# Patient Record
Sex: MALE | Race: WHITE | NOT HISPANIC OR LATINO
[De-identification: names, ages, dates, MRNs, and addresses within clinical notes are randomized per-mention and may not be internally consistent; named-entity substitution may affect disease eponyms.]

---
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
## 2021-01-12 ENCOUNTER — TRANSCRIPTION ENCOUNTER (OUTPATIENT)
Age: 21
End: 2021-01-12

## 2021-04-16 ENCOUNTER — APPOINTMENT (OUTPATIENT)
Dept: PEDIATRIC ORTHOPEDIC SURGERY | Facility: CLINIC | Age: 21
End: 2021-04-16
Payer: COMMERCIAL

## 2021-04-16 PROCEDURE — 73610 X-RAY EXAM OF ANKLE: CPT | Mod: RT

## 2021-04-16 PROCEDURE — 99072 ADDL SUPL MATRL&STAF TM PHE: CPT

## 2021-04-16 PROCEDURE — 99213 OFFICE O/P EST LOW 20 MIN: CPT | Mod: 25

## 2021-04-16 PROCEDURE — 29425 APPL SHORT LEG CAST WALKING: CPT | Mod: RT

## 2021-05-05 ENCOUNTER — APPOINTMENT (OUTPATIENT)
Dept: PEDIATRIC ORTHOPEDIC SURGERY | Facility: CLINIC | Age: 21
End: 2021-05-05
Payer: COMMERCIAL

## 2021-05-05 PROCEDURE — 99072 ADDL SUPL MATRL&STAF TM PHE: CPT

## 2021-05-05 PROCEDURE — 99214 OFFICE O/P EST MOD 30 MIN: CPT | Mod: 25

## 2021-05-05 PROCEDURE — 73610 X-RAY EXAM OF ANKLE: CPT | Mod: RT

## 2021-05-05 NOTE — PHYSICAL EXAM
[LE] : sensory intact in bilateral  lower extremities [Knee] : bilateral knees [Normal] : good posture [LLE] : left lower extremity [FreeTextEntry1] : Gait: NWB RLE. Good coordination and balance noted.\par GENERAL: alert, cooperative, in NAD\par SKIN: The skin is intact, warm, pink and dry over the area examined.\par EYES: Normal conjunctiva, normal eyelids and pupils were equal and round.\par ENT: normal ears, normal nose and normal lips.\par CARDIOVASCULAR: brisk capillary refill, but no peripheral edema.\par RESPIRATORY: The patient is in no apparent respiratory distress. They're taking full deep breaths without use of accessory muscles or evidence of audible wheezes or stridor without the use of a stethoscope. Normal respiratory effort.\par ABDOMEN: not examined\par \par right ankle\par skin is warm and intact with no bony deformities, edema, ecchymosis, or erythema noted over the ankle.\par Full active and passive ROM \par Toes are warm, pink, and moving freely\par Appropriate arch is present in both feet\par 2+ palpable pulses\par Brisk capillary refill <2seconds in all toes\par Neurologically intact with full sensation to palpation \par 5/5 muscle strength\par tenderness to palpation over the medial malleolus\par There is no tenderness to palpation over the lateral, and posterior malleolus\par There is no tenderness over the anterior aspect of the ankle, anterior and posterior tibiofibular ligament or deltoid ligament.\par Negative anterior drawer sign \par No lymphedema Right Hand/Left Foot

## 2021-05-05 NOTE — REVIEW OF SYSTEMS
[Change in Activity] : change in activity [Itching] : no itching [Redness] : no redness [Sore Throat] : no sore throat [Murmur] : no murmur [Wheezing] : no wheezing [Asthma] : no asthma [Vomiting] : no vomiting [Constipation] : no constipation [Bladder Infection] : no bladder infection [Limping] : limping [Joint Pains] : arthralgias [Joint Swelling] : joint swelling  [Muscle Aches] : no muscle aches [Seizure] : no seizures [Hyperactive] : no hyperactive behavior [Cold Intolerance] : cold tolerant [Swollen Glands] : no lymphadenopathy [Seasonal Allergies] : no seasonal allergies

## 2021-05-05 NOTE — DATA REVIEWED
[de-identified] : XR of the R ankle 4/16: displaced medial malleolus fracture noted. Skeletally mature. No evidence of periosteal reaction or callus formation

## 2021-05-05 NOTE — ASSESSMENT
[FreeTextEntry1] : 20 year old male with R medial malleolus minimally displaced avulsion fracture 4/14/21.\par \par The condition, natural history, and prognosis were explained to the patient and family. The clinical findings and images were reviewed with the patient.\par \par The fracture has healed uneventfully. He may discontinue the cast. I have fitted him for a CAM boot today. He should have the boot whenever he is up ambulating. He may take the boot off for showers and in bed. When out of the boot, he should work on ankle ROM. He may WBAT in the CAM. I will see him back in 2 weeks for clinical check and clearance of activities.\par \par All questions and concerns were addressed today. Parent and patient verbalize understanding and agree with plan of care.

## 2021-05-05 NOTE — REASON FOR VISIT
[Follow Up] : a follow up visit [FreeTextEntry1] : R avulsion fracture of medial malleolus [Patient] : patient

## 2021-05-05 NOTE — REVIEW OF SYSTEMS
[Change in Activity] : change in activity [Limping] : limping [Joint Pains] : arthralgias [Joint Swelling] : joint swelling  [Itching] : no itching [Redness] : no redness [Sore Throat] : no sore throat [Murmur] : no murmur [Wheezing] : no wheezing [Asthma] : no asthma [Constipation] : no constipation [Vomiting] : no vomiting [Bladder Infection] : no bladder infection [Muscle Aches] : no muscle aches [Seizure] : no seizures [Hyperactive] : no hyperactive behavior [Cold Intolerance] : cold tolerant [Swollen Glands] : no lymphadenopathy [Seasonal Allergies] : no seasonal allergies
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
## 2021-05-05 NOTE — PHYSICAL EXAM
[LE] : sensory intact in bilateral  lower extremities [Knee] : bilateral knees [Normal] : good posture [LLE] : left lower extremity [FreeTextEntry1] : Gait: NWB RLE. Good coordination and balance noted.\par GENERAL: alert, cooperative, in NAD\par SKIN: The skin is intact, warm, pink and dry over the area examined.\par EYES: Normal conjunctiva, normal eyelids and pupils were equal and round.\par ENT: normal ears, normal nose and normal lips.\par CARDIOVASCULAR: brisk capillary refill, but no peripheral edema.\par RESPIRATORY: The patient is in no apparent respiratory distress. They're taking full deep breaths without use of accessory muscles or evidence of audible wheezes or stridor without the use of a stethoscope. Normal respiratory effort.\par ABDOMEN: not examined\par \par Right ankle\par skin is warm and intact with no bony deformities, edema, ecchymosis, or erythema noted over the ankle.\par Full active and passive ROM \par Toes are warm, pink, and moving freely\par Appropriate arch is present in both feet\par 2+ palpable pulses\par Brisk capillary refill <2seconds in all toes\par Neurologically intact with full sensation to palpation \par 5/5 muscle strength\par NO tenderness to palpation over the medial malleolus\par There is no tenderness to palpation over the lateral, and posterior malleolus\par There is no tenderness over the anterior aspect of the ankle, anterior and posterior tibiofibular ligament or deltoid ligament.\par Negative anterior drawer sign \par No lymphedema

## 2021-05-05 NOTE — END OF VISIT
[FreeTextEntry3] : IMiguelito MD, personally saw and evaluated the patient and developed the plan as documented above. I concur or have edited the note as appropriate.

## 2021-05-05 NOTE — ASSESSMENT
[FreeTextEntry1] : 20 year old male with R medial malleolus minimally displaced avulsion fracture, 2 days out (DOI: 4/14/21)\par \par - We discussed the history, physical exam, and all available imaging at length during today's visit\par - Documentation from urgent care center was reviewed today\par - As per imaging of the R ankle, patient has a minimally displaced avulsion fracture of the medial malleolus\par - We discussed the etiology, pathoanatomy, treatment modalities, and expected natural history of medial malleolus fractures\par - Given imaging findings, recommendation at this time is cast immobilization\par - A well-padded and molded short leg cast was applied today in clinic. Cast care instructions were reviewed.\par - nonweightbearing on right lower extremity.\par - Rest and elevation\par - Over-the-counter nonsteroidal anti-inflammatory medications as needed\par - Absolutely no physical activities at this time.\par - We will plan to see Arguello back in 3 weeks for cast removal, XR of the R ankle OOC, and repeat clinical examination. Anticipate transition to CAM walker boot at that time.\par \par All questions and concerns were addressed today. Parent and patient verbalize understanding and agree with plan of care.\par \par I, Prakash Jarvis PA-C, have acted as a scribe and documented the above for Dr. Conteh.

## 2021-05-05 NOTE — DATA REVIEWED
[de-identified] : XR of the R ankle 5/5: minimally displaced avulsion fracture of medial malleolus fracture with ? signs of interval healing/bridging callus\par \par XR of the R ankle 4/16: displaced medial malleolus fracture noted. Skeletally mature. No evidence of periosteal reaction or callus formation

## 2021-05-19 ENCOUNTER — APPOINTMENT (OUTPATIENT)
Dept: PEDIATRIC ORTHOPEDIC SURGERY | Facility: CLINIC | Age: 21
End: 2021-05-19
Payer: COMMERCIAL

## 2021-05-19 DIAGNOSIS — F07.81 POSTCONCUSSIONAL SYNDROME: ICD-10-CM

## 2021-05-19 DIAGNOSIS — S82.51XA DISPLACED FRACTURE OF MEDIAL MALLEOLUS OF RIGHT TIBIA, INITIAL ENCOUNTER FOR CLOSED FRACTURE: ICD-10-CM

## 2021-05-19 PROCEDURE — 99213 OFFICE O/P EST LOW 20 MIN: CPT

## 2021-05-19 NOTE — REVIEW OF SYSTEMS
[Change in Activity] : change in activity [Itching] : no itching [Redness] : no redness [Murmur] : no murmur [Sore Throat] : no sore throat [Wheezing] : no wheezing [Asthma] : no asthma [Vomiting] : no vomiting [Bladder Infection] : no bladder infection [Constipation] : no constipation [Limping] : limping [Joint Pains] : arthralgias [Joint Swelling] : joint swelling  [Muscle Aches] : no muscle aches [Seizure] : no seizures [Hyperactive] : no hyperactive behavior [Cold Intolerance] : cold tolerant [Swollen Glands] : no lymphadenopathy [Seasonal Allergies] : no seasonal allergies
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
## 2021-05-19 NOTE — ASSESSMENT
[FreeTextEntry1] : 20 year old male with R medial malleolus minimally displaced avulsion fracture 4/14/21.\par \par The condition, natural history, and prognosis were explained to the patient and family. The clinical findings and images were reviewed with the patient.\par \par He is doing well. We can discontinue use of the CAM boot. He may return to all activities as tolerated. He may return to work tomorrow. He is out of school until the fall.\par \par With regards to his spine/back. I will see him back in 6 weeks after he has resumed his PT from complete x-rays of the spine. I provided him with a new script for his cervical disc herniation, and his post concussion syndrome. \par \par All questions and concerns were addressed today. Parent and patient verbalize understanding and agree with plan of care.

## 2021-05-19 NOTE — REASON FOR VISIT
[Follow Up] : a follow up visit [FreeTextEntry1] : R avulsion fracture of medial malleolus, post concussion syndrome, cervical herniated discs [Patient] : patient

## 2021-05-19 NOTE — DATA REVIEWED
[de-identified] : XR of the R ankle 5/5: minimally displaced avulsion fracture of medial malleolus fracture with ? signs of interval healing/bridging callus\par \par XR of the R ankle 4/16: displaced medial malleolus fracture noted. Skeletally mature. No evidence of periosteal reaction or callus formation

## 2021-05-25 ENCOUNTER — APPOINTMENT (OUTPATIENT)
Dept: ORTHOPEDIC SURGERY | Facility: CLINIC | Age: 21
End: 2021-05-25
Payer: COMMERCIAL

## 2021-05-25 ENCOUNTER — NON-APPOINTMENT (OUTPATIENT)
Age: 21
End: 2021-05-25

## 2021-05-25 DIAGNOSIS — S63.289A DISLOCATION OF PROXIMAL INTERPHALANGEAL JOINT OF UNSPECIFIED FINGER, INITIAL ENCOUNTER: ICD-10-CM

## 2021-05-25 PROCEDURE — 99204 OFFICE O/P NEW MOD 45 MIN: CPT

## 2021-05-25 PROCEDURE — 73140 X-RAY EXAM OF FINGER(S): CPT | Mod: F4

## 2021-05-25 NOTE — HISTORY OF PRESENT ILLNESS
[FreeTextEntry1] : 20-year-old male presents today for evaluation of a left small finger injury.  He reports he was running sprints with his friends on a turf field and fell forward onto his left hand, and notes someone fell on top of him.  He reports dislocation of the left small finger at the PIP joint.  He was seen and evaluated in urgent care where the finger was successfully reduced.  X-rays were taken revealing no acute fracture.  The patient was recommended for follow-up here due to questionable tendon injury.  He reports his pain is moderate in nature, worse with motion.  He has been wearing a finger splint at all times.  He notes mild sensation loss to the left small finger compared to the right.

## 2021-05-25 NOTE — PHYSICAL EXAM
[de-identified] : Left small finger: \par Skin intact, mild to moderate ecchymosis along the PIP joint, mild swelling, no erythema.  There is no deformity.\par Tenderness to palpation along the PIP joint.  No tenderness along the DIP joint or MCP joint.  Range of motion of the digit reduced secondary to recent dislocation and pain.  There is active extension and active flexion noted, finger tip tp palm distance 2 cm.  Sensation intact to light touch.  Vascular intact 2+ pulses distally. [de-identified] : 3 x-ray views of the left finger taken today reveal maintained reduction of the PIP joint, no acute fracture.

## 2021-05-25 NOTE — ASSESSMENT
[FreeTextEntry1] : 20-year-old male status post close reduction of a left small finger PIP joint dislocation. He is healing well, I recommend discontinuation of the splint and use of johnathan loop for immobilization.He'll continue active and gentle passive range of motion as tolerated, followup 2 weeks for range of motion check.

## 2021-06-09 ENCOUNTER — APPOINTMENT (OUTPATIENT)
Dept: ORTHOPEDIC SURGERY | Facility: CLINIC | Age: 21
End: 2021-06-09

## 2021-06-28 DIAGNOSIS — M40.04 POSTURAL KYPHOSIS, THORACIC REGION: ICD-10-CM

## 2021-06-30 ENCOUNTER — APPOINTMENT (OUTPATIENT)
Dept: PEDIATRIC ORTHOPEDIC SURGERY | Facility: CLINIC | Age: 21
End: 2021-06-30

## 2021-10-24 ENCOUNTER — EMERGENCY (EMERGENCY)
Facility: HOSPITAL | Age: 21
LOS: 1 days | Discharge: HOME | End: 2021-10-24
Attending: EMERGENCY MEDICINE
Payer: COMMERCIAL

## 2021-10-24 VITALS
SYSTOLIC BLOOD PRESSURE: 140 MMHG | WEIGHT: 225.09 LBS | HEART RATE: 86 BPM | RESPIRATION RATE: 20 BRPM | HEIGHT: 76 IN | TEMPERATURE: 99 F | OXYGEN SATURATION: 98 % | DIASTOLIC BLOOD PRESSURE: 71 MMHG

## 2021-10-24 PROCEDURE — 99283 EMERGENCY DEPT VISIT LOW MDM: CPT | Mod: 25

## 2021-10-24 PROCEDURE — 73564 X-RAY EXAM KNEE 4 OR MORE: CPT | Mod: 26,RT

## 2021-10-24 PROCEDURE — 99283 EMERGENCY DEPT VISIT LOW MDM: CPT

## 2021-10-24 PROCEDURE — 73564 X-RAY EXAM KNEE 4 OR MORE: CPT

## 2021-10-24 NOTE — ED STATDOCS - OBJECTIVE STATEMENT
20 y/o male no pmh c/o pain to right knee s/p fall yesterday while working. states fell to side and felt like knee twisted/tweaked. has been using crutches, can put some prssure on it but hurts especially when extended. no tingling/numbness. reports very minor intermittent discomfort to right wrist only when moving it laterally. no other injury.    ROS: No fever/chills. No eye pain/changes in vision, No ear pain/sore throat/dysphagia, No chest pain/palpitations. No SOB/cough/. No abdominal pain, N/V/D, no black/bloody bm. No dysuria/frequency/discharge, No headache. No Dizziness.    No rashes or breaks in skin. No numbness/tingling/weakness.

## 2021-10-24 NOTE — ED STATDOCS - PHYSICAL EXAMINATION
Gen: No acute distress, non toxic  HEENT: Mucous membranes moist, pink conjunctivae, EOMI  CV: RRR  Resp: CTAB, normal rate and effort  GI: Abdomen soft, NT, ND. No rebound, no guarding  msk: righ tknee with minor swelling, mild lateral ttp, full rom, no deformity. neurovascularly intact. right wrist without deformity or ttp. no swelling  Neuro: A&O x 3, moving all 4 extremities

## 2021-10-24 NOTE — ED STATDOCS - PATIENT PORTAL LINK FT
You can access the FollowMyHealth Patient Portal offered by [facility] by registering at the following website: [email or web address]. By joining [facility]’s FollowMyHealth portal, you will also be able to view your health information using other applications (apps) compatible with our system.

## 2021-10-24 NOTE — ED STATDOCS - CARE PROVIDER_API CALL
Laura Dillon)  Orthopedics  [address] [place] [address], [address]  [place] [address]  Phone: [phone]  Fax: [phone]  Follow Up Time: 7-10 Days

## 2021-10-26 ENCOUNTER — APPOINTMENT (OUTPATIENT)
Dept: ORTHOPEDIC SURGERY | Facility: CLINIC | Age: 21
End: 2021-10-26
Payer: COMMERCIAL

## 2021-10-26 VITALS
SYSTOLIC BLOOD PRESSURE: 114 MMHG | HEIGHT: 76 IN | BODY MASS INDEX: 27.4 KG/M2 | WEIGHT: 225 LBS | DIASTOLIC BLOOD PRESSURE: 52 MMHG | HEART RATE: 72 BPM

## 2021-10-26 DIAGNOSIS — F17.200 NICOTINE DEPENDENCE, UNSPECIFIED, UNCOMPLICATED: ICD-10-CM

## 2021-10-26 DIAGNOSIS — M25.561 PAIN IN RIGHT KNEE: ICD-10-CM

## 2021-10-26 DIAGNOSIS — F12.90 CANNABIS USE, UNSPECIFIED, UNCOMPLICATED: ICD-10-CM

## 2021-10-26 DIAGNOSIS — Z83.3 FAMILY HISTORY OF DIABETES MELLITUS: ICD-10-CM

## 2021-10-26 PROCEDURE — 99214 OFFICE O/P EST MOD 30 MIN: CPT

## 2021-10-29 PROBLEM — Z83.3 FAMILY HISTORY OF DIABETES MELLITUS: Status: ACTIVE | Noted: 2021-10-26

## 2021-10-29 PROBLEM — F17.200 CURRENT EVERY DAY SMOKER: Status: ACTIVE | Noted: 2021-10-26

## 2021-10-29 PROBLEM — F12.90 USES MARIJUANA: Status: ACTIVE | Noted: 2021-10-29

## 2021-11-02 ENCOUNTER — APPOINTMENT (OUTPATIENT)
Dept: ORTHOPEDIC SURGERY | Facility: CLINIC | Age: 21
End: 2021-11-02

## 2022-07-14 ENCOUNTER — APPOINTMENT (OUTPATIENT)
Dept: ORTHOPEDIC SURGERY | Facility: CLINIC | Age: 22
End: 2022-07-14